# Patient Record
Sex: MALE
[De-identification: names, ages, dates, MRNs, and addresses within clinical notes are randomized per-mention and may not be internally consistent; named-entity substitution may affect disease eponyms.]

---

## 2018-08-11 ENCOUNTER — HOSPITAL ENCOUNTER (EMERGENCY)
Dept: HOSPITAL 25 - UCEAST | Age: 78
Discharge: HOME | End: 2018-08-11
Payer: MEDICARE

## 2018-08-11 VITALS — DIASTOLIC BLOOD PRESSURE: 50 MMHG | SYSTOLIC BLOOD PRESSURE: 132 MMHG

## 2018-08-11 DIAGNOSIS — Z88.0: ICD-10-CM

## 2018-08-11 DIAGNOSIS — Z87.891: ICD-10-CM

## 2018-08-11 DIAGNOSIS — R50.9: ICD-10-CM

## 2018-08-11 DIAGNOSIS — R53.1: Primary | ICD-10-CM

## 2018-08-11 DIAGNOSIS — R42: ICD-10-CM

## 2018-08-11 DIAGNOSIS — Z88.8: ICD-10-CM

## 2018-08-11 PROCEDURE — 93005 ELECTROCARDIOGRAM TRACING: CPT

## 2018-08-11 PROCEDURE — 87086 URINE CULTURE/COLONY COUNT: CPT

## 2018-08-11 PROCEDURE — 81003 URINALYSIS AUTO W/O SCOPE: CPT

## 2018-08-11 PROCEDURE — G0463 HOSPITAL OUTPT CLINIC VISIT: HCPCS

## 2018-08-11 PROCEDURE — 99202 OFFICE O/P NEW SF 15 MIN: CPT

## 2018-08-11 PROCEDURE — 71046 X-RAY EXAM CHEST 2 VIEWS: CPT

## 2018-08-11 NOTE — RAD
INDICATION:  Weakness dizziness fever and chills, history of asbestosis.



COMPARISON:  There are no prior studies available for comparison.



TECHNIQUE: Dual-energy PA  and lateral views of the chest were obtained.



FINDINGS:   The heart is within normal limits in size. Mediastinal and hilar contours

appear within normal limits.



There is mild prominence of the interstitial markings and numerous bilateral pleural based

nodules most consistent with plaques. These are both calcified and noncalcified. These

limit evaluation for an infiltrate. There is also mild blunting of the right costophrenic

angle consistent with a small pleural effusion or pleural thickening.



IMPRESSION:  

1. BILATERAL PLEURAL PLAQUES AND FIBROTIC CHANGES CONSISTENT WITH THE PATIENT'S HISTORY OF

PRIOR ASBESTOS EXPOSURE. IF THE PATIENT HAS PRIOR OUTSIDE FILMS THESE WOULD BE HELPFUL FOR

COMPARISON.

2. BLUNTING OF THE RIGHT COSTOPHRENIC ANGLE MOST CONSISTENT WITH PLEURAL THICKENING OR

SMALL PLEURAL EFFUSION.



R0

## 2018-08-11 NOTE — UC
HPI Febrile Illness





- HPI Summary


HPI Summary: 





Patient is a 78-year-old male presents here with weakness dizziness fever and 

shaking chills.  He states that on 8/9/2018 in the a.m. he had a cystoscopy 

performed.  Later that day he developed fever and chills.  Rates that an 

antibiotic was called in.  He was started on Septra double strength twice a 

day.  He has taken 4 doses of that.  She still has felt feverish and had had 

shaking chills.  He feels weak and has had nausea.  Not had any chest pain.  He 

chronically feels short winded.  He states he has asbestosis.  The patient is a 

diabetic.  He states that his blood sugars have been running in the 100s.  He 

denies any rash.  He has no headache.





- History of Current Complaint


Chief Complaint: UCGeneralIllness


Time Seen by Provider: 08/11/18 19:19


Hx Obtained From: Patient


Onset/Duration: Started Days Ago


Timing: Constant


Initial Severity: Moderate


Current Severity: Moderate


Pain Intensity: 0


Pain Scale Used: 0-10 Numeric


Alleviating Factors: Nothing


Associated Signs and Symptoms: Chills, Diaphoresis, Nausea





- Allergy/Home Medications


Allergies/Adverse Reactions: 


 Allergies











Allergy/AdvReac Type Severity Reaction Status Date / Time


 


Penicillins Allergy Intermediate Rash Verified 08/11/18 19:25


 


metformin Allergy  GI Upset Verified 08/11/18 19:25











Home Medications: 


 Home Medications





Acetaminophen [Tylenol Extra Strength] 1,000 mg 08/11/18 [History]


Sulfamethox/Trimethoprim DS* [Bactrim /160 TAB*] 1 tab 08/11/18 [History]











PMH/Surg Hx/FS Hx/Imm Hx


Endocrine History: Diabetes


Cardiovascular History: Cardiac Disease, Hypertension


Respiratory History: Other


Other Respiratory History: asbestosis


GI/ History: Other


Other GI/ History: bladder stones





- Surgical History


Surgical History: Yes


Surgery Procedure, Year, and Place: 2009 GALLBLADDER REMOVAL.  EYE SURGERY 

DETACHED RETINA 98 AND 2005 BILATEARL.  RIGHT PARS PLANA VITRECTOMY.  08 LT 

PARS PLANA VITRECTOMY.  BILATERAL CATARACTS SURGERY.  BLADDER STONES- 1996.  

CYST REMOVAL-[facility]- BUTTOCKS AREA.  BILATERAL CARPAL TUNNEL RELEASE





- Social History


Alcohol Use: Occasionally


Substance Use Type: None


Smoking Status (MU): Former Smoker


Amount Used/How Often: 1 PPD X 32 YEARS


When Did the Patient Quit Smoking/Using Tobacco: 1996





Review of Systems


Constitutional: Fever, Chills, Fatigue


Skin: Negative


Eyes: Negative


ENT: Negative


Respiratory: Shortness Of Breath - chronic


Gastrointestinal: Nausea


Motor: Negative


Neurovascular: Negative


Musculoskeletal: Negative


Neurological: Negative


All Other Systems Reviewed And Are Negative: Yes





Physical Exam


Triage Information Reviewed: Yes


Appearance: Well-Appearing, No Pain Distress, Well-Nourished


Vital Signs: 


 Initial Vital Signs











Temp  98.9 F   08/11/18 19:18


 


Pulse  69   08/11/18 19:18


 


Resp  18   08/11/18 19:18


 


BP  128/51   08/11/18 19:18


 


Pulse Ox  98   08/11/18 19:18











Vital Signs Reviewed: Yes


Eyes: Positive: Conjunctiva Clear


ENT: Positive: Hearing grossly normal.  Negative: Nasal congestion, Nasal 

drainage, Tonsillar swelling, Tonsillar exudate, Sinus tenderness, Uvula midline


Neck: Positive: Supple, Nontender, No Lymphadenopathy


Respiratory: Positive: Lungs clear, Normal breath sounds, No respiratory 

distress, No accessory muscle use


Cardiovascular: Positive: RRR, No Murmur


Abdomen Description: Positive: Nontender, No Organomegaly, Soft.  Negative: CVA 

Tenderness (R), CVA Tenderness (L)


Bowel Sounds: Positive: Present


Musculoskeletal: Positive: ROM Intact, No Edema


Neurological: Positive: Alert


Psychological Exam: Normal


Skin Exam: Normal





Diagnostics





- Laboratory


Diagnostic Studies Completed/Ordered: .  UA + rbcs, (-) leuks, (-) nitrite





- Radiology


  ** No standard instances


Xray Interpretation: Positive (See Comments) - . BILATERAL PLEURAL PLAQUES AND 

FIBROTIC CHANGES CONSISTENT WITH THE PATIENT'S HISTORY OF PRIOR ASBESTOS 

EXPOSURE. IF THE PATIENT HAS PRIOR OUTSIDE FILMS THESE WOULD BE HELPFUL FOR 

COMPARISON. 2. BLUNTING OF THE RIGHT COSTOPHRENIC ANGLE MOST CONSISTENT WITH 

PLEURAL THICKENING OR SMALL PLEURAL EFFUSION.


Radiology Interpretation Completed By: Radiologist





- EKG


Cardiac Rate: NL


Cardiac Rhythm: Sinus: Normal


Ectopy: None


ST Segment: Normal





Course/Dx





- Course


Course Of Treatment: declines going to ER for furhter evaluation or blood work





- Diagnoses


Clinic Provider Diagnoses: Weakness.  I suspect he has a UTI that is being 

treated





Discharge





- Sign-Out/Discharge


Documenting (check all that apply): Patient Departure





- Discharge Plan


Condition: Stable


Disposition: HOME


Patient Education Materials:  Weakness (ED)


Referrals: 


Yasir Owusu MD [Primary Care Provider] - 2 Days


Additional Instructions: 


To ER for new or worsening symptoms





continue antibiotic





rest





fluids





Your chest XR report is pending


I suggest you have a copy of the official report sent to the group that follows 

your asbestosis exposure











- Billing Disposition and Condition


Condition: STABLE


Disposition: Home

## 2018-08-13 NOTE — UC
- Progress Note


Progress Note: 





8/13/2018


Pt seen on 8/11/2018 w/ possible UTI


Urine culture: no growth.


No change


Anamika Melgoza PA-C





Discharge





- Sign-Out/Discharge


Documenting (check all that apply): Patient Departure - D/c home





- Discharge Plan


Condition: Stable


Disposition: HOME


Patient Education Materials:  Weakness (ED)


Referrals: 


Yasir Owusu MD [Primary Care Provider] - 2 Days


Additional Instructions: 


To ER for new or worsening symptoms





continue antibiotic





rest





fluids





Your chest XR report is pending


I suggest you have a copy of the official report sent to the group that follows 

your asbestosis exposure











- Billing Disposition and Condition


Condition: STABLE


Disposition: Home